# Patient Record
Sex: FEMALE | Race: WHITE | Employment: OTHER | URBAN - METROPOLITAN AREA
[De-identification: names, ages, dates, MRNs, and addresses within clinical notes are randomized per-mention and may not be internally consistent; named-entity substitution may affect disease eponyms.]

---

## 2018-03-10 ENCOUNTER — HOSPITAL ENCOUNTER (OUTPATIENT)
Age: 78
Discharge: HOME | End: 2018-03-10
Attending: EMERGENCY MEDICINE
Payer: MEDICARE

## 2018-03-10 VITALS
HEIGHT: 60 IN | HEART RATE: 74 BPM | RESPIRATION RATE: 16 BRPM | DIASTOLIC BLOOD PRESSURE: 78 MMHG | OXYGEN SATURATION: 97 % | TEMPERATURE: 98 F | SYSTOLIC BLOOD PRESSURE: 166 MMHG | WEIGHT: 124 LBS | BODY MASS INDEX: 24.35 KG/M2
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
## 2018-03-10 DIAGNOSIS — S51.801A AVULSION OF SKIN OF RIGHT FOREARM, INITIAL ENCOUNTER: Primary | ICD-10-CM

## 2018-03-10 PROCEDURE — 90471 IMMUNIZATION ADMIN: CPT

## 2018-03-10 PROCEDURE — 99203 OFFICE O/P NEW LOW 30 MIN: CPT

## 2018-03-10 RX ORDER — LEVOTHYROXINE SODIUM 175 UG/1
200 TABLET ORAL
COMMUNITY

## 2018-03-10 RX ORDER — LISINOPRIL 20 MG/1
20 TABLET ORAL DAILY
COMMUNITY

## 2018-03-10 NOTE — ED INITIAL ASSESSMENT (HPI)
Patient presents with cc of fall this am sustaining skin tear to right forearm after hitting a door knob. No head injury or LOC.+DM

## 2018-03-10 NOTE — ED PROVIDER NOTES
Patient presents with:  Fall  Laceration Abrasion (integumentary)      HPI:     Amina Minaya is a 76year old female who presents\  Denisse Caesar this AM, tipped to the side and scraped arm on door handle. Did not fall. Has been unsteady a lot per daughter.      Kathe Finn nourished, well hydrated, no distress  SKIN: good skin turgor, no obvious rashes  HEENT: atraumatic, normocephalic, ears, nose and throat are clear  EYES: sclera non icteric bilateral  NECK: supple, no adenopathy, no thyromegaly  LUNGS: clear to auscultati

## 2020-08-31 ENCOUNTER — HOSPITAL ENCOUNTER (OUTPATIENT)
Facility: HOSPITAL | Age: 80
Setting detail: OBSERVATION
Discharge: HOME HEALTH | End: 2020-09-02
Attending: EMERGENCY MEDICINE | Admitting: HOSPITALIST
Payer: MEDICARE

## 2020-08-31 DIAGNOSIS — R19.7 INTERMITTENT DIARRHEA: ICD-10-CM

## 2020-08-31 DIAGNOSIS — R53.1 WEAKNESS GENERALIZED: ICD-10-CM

## 2020-08-31 DIAGNOSIS — E86.0 DEHYDRATION: ICD-10-CM

## 2020-08-31 DIAGNOSIS — N17.9 ACUTE KIDNEY INJURY (HCC): Primary | ICD-10-CM

## 2020-09-01 ENCOUNTER — APPOINTMENT (OUTPATIENT)
Dept: GENERAL RADIOLOGY | Facility: HOSPITAL | Age: 80
End: 2020-09-01
Attending: EMERGENCY MEDICINE
Payer: MEDICARE

## 2020-09-01 ENCOUNTER — APPOINTMENT (OUTPATIENT)
Dept: CT IMAGING | Facility: HOSPITAL | Age: 80
End: 2020-09-01
Attending: HOSPITALIST
Payer: MEDICARE

## 2020-09-01 PROBLEM — E86.0 DEHYDRATION: Status: ACTIVE | Noted: 2020-09-01

## 2020-09-01 PROBLEM — R19.7 INTERMITTENT DIARRHEA: Status: ACTIVE | Noted: 2020-09-01

## 2020-09-01 PROBLEM — N17.9 ACUTE KIDNEY INJURY (HCC): Status: ACTIVE | Noted: 2020-09-01

## 2020-09-01 PROBLEM — R53.1 WEAKNESS GENERALIZED: Status: ACTIVE | Noted: 2020-09-01

## 2020-09-01 LAB
ALBUMIN SERPL-MCNC: 3.5 G/DL (ref 3.4–5)
ALBUMIN/GLOB SERPL: 0.8 {RATIO} (ref 1–2)
ALP LIVER SERPL-CCNC: 65 U/L (ref 55–142)
ALT SERPL-CCNC: 14 U/L (ref 13–56)
ANION GAP SERPL CALC-SCNC: 12 MMOL/L (ref 0–18)
ANION GAP SERPL CALC-SCNC: 7 MMOL/L (ref 0–18)
AST SERPL-CCNC: 19 U/L (ref 15–37)
ATRIAL RATE: 97 BPM
BASOPHILS # BLD AUTO: 0.04 X10(3) UL (ref 0–0.2)
BASOPHILS # BLD AUTO: 0.04 X10(3) UL (ref 0–0.2)
BASOPHILS NFR BLD AUTO: 0.4 %
BASOPHILS NFR BLD AUTO: 0.5 %
BILIRUB SERPL-MCNC: 0.6 MG/DL (ref 0.1–2)
BILIRUB UR QL STRIP.AUTO: NEGATIVE
BUN BLD-MCNC: 24 MG/DL (ref 7–18)
BUN BLD-MCNC: 28 MG/DL (ref 7–18)
BUN/CREAT SERPL: 17.9 (ref 10–20)
BUN/CREAT SERPL: 18.9 (ref 10–20)
CALCIUM BLD-MCNC: 9.1 MG/DL (ref 8.5–10.1)
CALCIUM BLD-MCNC: 9.7 MG/DL (ref 8.5–10.1)
CHLORIDE SERPL-SCNC: 101 MMOL/L (ref 98–112)
CHLORIDE SERPL-SCNC: 106 MMOL/L (ref 98–112)
CO2 SERPL-SCNC: 18 MMOL/L (ref 21–32)
CO2 SERPL-SCNC: 21 MMOL/L (ref 21–32)
COLOR UR AUTO: YELLOW
CREAT BLD-MCNC: 1.27 MG/DL (ref 0.55–1.02)
CREAT BLD-MCNC: 1.56 MG/DL (ref 0.55–1.02)
DEPRECATED HBV CORE AB SER IA-ACNC: 161.1 NG/ML (ref 18–340)
DEPRECATED RDW RBC AUTO: 42.2 FL (ref 35.1–46.3)
DEPRECATED RDW RBC AUTO: 42.7 FL (ref 35.1–46.3)
EOSINOPHIL # BLD AUTO: 0.04 X10(3) UL (ref 0–0.7)
EOSINOPHIL # BLD AUTO: 0.06 X10(3) UL (ref 0–0.7)
EOSINOPHIL NFR BLD AUTO: 0.4 %
EOSINOPHIL NFR BLD AUTO: 0.8 %
ERYTHROCYTE [DISTWIDTH] IN BLOOD BY AUTOMATED COUNT: 11.3 % (ref 11–15)
ERYTHROCYTE [DISTWIDTH] IN BLOOD BY AUTOMATED COUNT: 11.5 % (ref 11–15)
EST. AVERAGE GLUCOSE BLD GHB EST-MCNC: 203 MG/DL (ref 68–126)
GLOBULIN PLAS-MCNC: 4.4 G/DL (ref 2.8–4.4)
GLUCOSE BLD-MCNC: 114 MG/DL (ref 70–99)
GLUCOSE BLD-MCNC: 124 MG/DL (ref 70–99)
GLUCOSE BLD-MCNC: 157 MG/DL (ref 70–99)
GLUCOSE BLD-MCNC: 169 MG/DL (ref 70–99)
GLUCOSE BLD-MCNC: 172 MG/DL (ref 70–99)
GLUCOSE BLD-MCNC: 62 MG/DL (ref 70–99)
GLUCOSE UR STRIP.AUTO-MCNC: NEGATIVE MG/DL
HBA1C MFR BLD HPLC: 8.7 % (ref ?–5.7)
HCT VFR BLD AUTO: 31.5 % (ref 35–48)
HCT VFR BLD AUTO: 34.9 % (ref 35–48)
HGB BLD-MCNC: 10.5 G/DL (ref 12–16)
HGB BLD-MCNC: 11.8 G/DL (ref 12–16)
HYALINE CASTS #/AREA URNS AUTO: PRESENT /LPF
IMM GRANULOCYTES # BLD AUTO: 0.02 X10(3) UL (ref 0–1)
IMM GRANULOCYTES # BLD AUTO: 0.04 X10(3) UL (ref 0–1)
IMM GRANULOCYTES NFR BLD: 0.3 %
IMM GRANULOCYTES NFR BLD: 0.4 %
IRON SATURATION: 13 % (ref 15–50)
IRON SERPL-MCNC: 39 UG/DL (ref 50–170)
LYMPHOCYTES # BLD AUTO: 1.41 X10(3) UL (ref 1–4)
LYMPHOCYTES # BLD AUTO: 1.57 X10(3) UL (ref 1–4)
LYMPHOCYTES NFR BLD AUTO: 14.3 %
LYMPHOCYTES NFR BLD AUTO: 20.2 %
M PROTEIN MFR SERPL ELPH: 7.9 G/DL (ref 6.4–8.2)
MCH RBC QN AUTO: 34.1 PG (ref 26–34)
MCH RBC QN AUTO: 34.4 PG (ref 26–34)
MCHC RBC AUTO-ENTMCNC: 33.3 G/DL (ref 31–37)
MCHC RBC AUTO-ENTMCNC: 33.8 G/DL (ref 31–37)
MCV RBC AUTO: 101.7 FL (ref 80–100)
MCV RBC AUTO: 102.3 FL (ref 80–100)
MONOCYTES # BLD AUTO: 0.69 X10(3) UL (ref 0.1–1)
MONOCYTES # BLD AUTO: 0.87 X10(3) UL (ref 0.1–1)
MONOCYTES NFR BLD AUTO: 8.8 %
MONOCYTES NFR BLD AUTO: 8.9 %
NEUTROPHILS # BLD AUTO: 5.4 X10 (3) UL (ref 1.5–7.7)
NEUTROPHILS # BLD AUTO: 5.4 X10(3) UL (ref 1.5–7.7)
NEUTROPHILS # BLD AUTO: 7.44 X10 (3) UL (ref 1.5–7.7)
NEUTROPHILS # BLD AUTO: 7.44 X10(3) UL (ref 1.5–7.7)
NEUTROPHILS NFR BLD AUTO: 69.3 %
NEUTROPHILS NFR BLD AUTO: 75.7 %
NITRITE UR QL STRIP.AUTO: NEGATIVE
OSMOLALITY SERPL CALC.SUM OF ELEC: 282 MOSM/KG (ref 275–295)
OSMOLALITY SERPL CALC.SUM OF ELEC: 283 MOSM/KG (ref 275–295)
P AXIS: 68 DEGREES
P-R INTERVAL: 180 MS
PH UR STRIP.AUTO: 5 [PH] (ref 4.5–8)
PLATELET # BLD AUTO: 206 10(3)UL (ref 150–450)
PLATELET # BLD AUTO: 216 10(3)UL (ref 150–450)
POTASSIUM SERPL-SCNC: 3.9 MMOL/L (ref 3.5–5.1)
POTASSIUM SERPL-SCNC: 4.4 MMOL/L (ref 3.5–5.1)
PROT UR STRIP.AUTO-MCNC: NEGATIVE MG/DL
Q-T INTERVAL: 350 MS
QRS DURATION: 72 MS
QTC CALCULATION (BEZET): 444 MS
R AXIS: 18 DEGREES
RBC # BLD AUTO: 3.08 X10(6)UL (ref 3.8–5.3)
RBC # BLD AUTO: 3.43 X10(6)UL (ref 3.8–5.3)
SARS-COV-2 RNA RESP QL NAA+PROBE: NOT DETECTED
SODIUM SERPL-SCNC: 131 MMOL/L (ref 136–145)
SODIUM SERPL-SCNC: 134 MMOL/L (ref 136–145)
SP GR UR STRIP.AUTO: 1.02 (ref 1–1.03)
T AXIS: 59 DEGREES
TOTAL IRON BINDING CAPACITY: 289 UG/DL (ref 240–450)
TRANSFERRIN SERPL-MCNC: 194 MG/DL (ref 200–360)
TROPONIN I SERPL-MCNC: <0.045 NG/ML (ref ?–0.04)
TSI SER-ACNC: 1.74 MIU/ML (ref 0.36–3.74)
UROBILINOGEN UR STRIP.AUTO-MCNC: <2 MG/DL
VENTRICULAR RATE: 97 BPM
WBC # BLD AUTO: 7.8 X10(3) UL (ref 4–11)
WBC # BLD AUTO: 9.8 X10(3) UL (ref 4–11)

## 2020-09-01 PROCEDURE — 71045 X-RAY EXAM CHEST 1 VIEW: CPT | Performed by: EMERGENCY MEDICINE

## 2020-09-01 PROCEDURE — 99220 INITIAL OBSERVATION CARE,LEVL III: CPT | Performed by: HOSPITALIST

## 2020-09-01 PROCEDURE — 70450 CT HEAD/BRAIN W/O DYE: CPT | Performed by: HOSPITALIST

## 2020-09-01 RX ORDER — ACETAMINOPHEN 325 MG/1
650 TABLET ORAL EVERY 6 HOURS PRN
Status: DISCONTINUED | OUTPATIENT
Start: 2020-09-01 | End: 2020-09-02

## 2020-09-01 RX ORDER — DEXTROSE MONOHYDRATE 25 G/50ML
50 INJECTION, SOLUTION INTRAVENOUS
Status: DISCONTINUED | OUTPATIENT
Start: 2020-09-01 | End: 2020-09-02

## 2020-09-01 RX ORDER — HYDROCHLOROTHIAZIDE 25 MG/1
25 TABLET ORAL DAILY
Status: DISCONTINUED | OUTPATIENT
Start: 2020-09-01 | End: 2020-09-02

## 2020-09-01 RX ORDER — ONDANSETRON 2 MG/ML
4 INJECTION INTRAMUSCULAR; INTRAVENOUS EVERY 6 HOURS PRN
Status: DISCONTINUED | OUTPATIENT
Start: 2020-09-01 | End: 2020-09-02

## 2020-09-01 RX ORDER — ELVITEGRAVIR, COBICISTAT, EMTRICITABINE, AND TENOFOVIR ALAFENAMIDE 150; 150; 200; 10 MG/1; MG/1; MG/1; MG/1
TABLET ORAL
Status: ON HOLD | COMMUNITY
End: 2020-11-25

## 2020-09-01 RX ORDER — LEVOTHYROXINE SODIUM 0.2 MG/1
200 TABLET ORAL
Status: DISCONTINUED | OUTPATIENT
Start: 2020-09-01 | End: 2020-09-01 | Stop reason: SDUPTHER

## 2020-09-01 RX ORDER — LISINOPRIL 20 MG/1
20 TABLET ORAL DAILY
Status: DISCONTINUED | OUTPATIENT
Start: 2020-09-01 | End: 2020-09-02

## 2020-09-01 RX ORDER — METOCLOPRAMIDE HYDROCHLORIDE 5 MG/ML
10 INJECTION INTRAMUSCULAR; INTRAVENOUS EVERY 8 HOURS PRN
Status: DISCONTINUED | OUTPATIENT
Start: 2020-09-01 | End: 2020-09-02

## 2020-09-01 RX ORDER — SODIUM CHLORIDE 9 MG/ML
INJECTION, SOLUTION INTRAVENOUS CONTINUOUS
Status: DISCONTINUED | OUTPATIENT
Start: 2020-09-01 | End: 2020-09-02

## 2020-09-01 NOTE — ED PROVIDER NOTES
Patient Seen in: [facility] Emergency Department      History   Patient presents with:  Fatigue    Stated Complaint: weakness, blood sugars flucuating    HPI    68-year-old female past medical history of HIV diabetes asthma hypertension presents ED 25   Ht 152.4 cm (5')   Wt 45.4 kg   SpO2 98%   BMI 19.53 kg/m²         Physical Exam  Vitals signs and nursing note reviewed. Constitutional:       Appearance: She is well-developed. HENT:      Head: Normocephalic and atraumatic.    Eyes:      Pupils: CBC WITH DIFFERENTIAL WITH PLATELET    Narrative: The following orders were created for panel order CBC WITH DIFFERENTIAL WITH PLATELET.   Procedure                               Abnormality         Status                     --------- [facility])  (primary encounter diagnosis)  Dehydration  Weakness generalized  Intermittent diarrhea    Disposition:  Admit  9/1/2020  1:20 am    Follow-up:  No follow-up provider specified.         Medications Prescribed:  Current Discharge Medication List

## 2020-09-01 NOTE — ED INITIAL ASSESSMENT (HPI)
77 yo F, hx of HIV, DM II, presents with weakness x 4 days. Family reports she was walking 5 days ago and progressively getting weak. She ate some soup 4 days ago and had loose bowel movement and has been very weak since then.

## 2020-09-01 NOTE — PROGRESS NOTES
Pt seen and examined earlier this am. Overall better but still seems very weak.      Cont IVF  PT OT       Please see H&P from this am.     Concha Vaca MD

## 2020-09-01 NOTE — PROGRESS NOTES
NURSING ADMISSION NOTE      Patient admitted via Cart  Oriented to room. Safety precautions initiated. Bed in low position. Call light in reach.     Completed admission assessment with patient  Neuro checks Q 4  Enhanced precautions while PCR pending

## 2020-09-01 NOTE — PHYSICAL THERAPY NOTE
PHYSICAL THERAPY QUICK EVALUATION - INPATIENT    Room Number: 0340/4293-A  Evaluation Date: 9/1/2020  Presenting Problem: Dehydration, generalized weakness, MIREILLE and intermittent diarrhea  Physician Order: PT Eval and Treat     SARS-COV2 PCR pending    Ch 4/5    NEUROLOGICAL FINDINGS  Neurological Findings: Sensation           Sensation: BLE symmetrical/intact to light touch       ACTIVITY TOLERANCE                         O2 WALK                  AM-PAC '6-Clicks' INPATIENT SHORT FORM - BASIC MOBILITY  How veronica    Patient End of Session: In bed;Needs met;Call light within reach;RN aware of session/findings; All patient questions and concerns addressed; Alarm set    ASSESSMENT   Patient is a 78year old female admitted on 8/31/2020 for Dehydration, generaliz

## 2020-09-01 NOTE — OCCUPATIONAL THERAPY NOTE
OCCUPATIONAL THERAPY QUICK EVALUATION - INPATIENT    Room Number: 8727/9877-H  Evaluation Date: 9/1/2020     Type of Evaluation: Quick Eval  Presenting Problem: weakness, MIREILLE    Physician Order: IP Consult to Occupational Therapy  Reason for Therapy:  ADL/ following;  Right Shoulder flexion  4-/5  Left Shoulder flexion  4-/5    NEUROLOGICAL FINDINGS                   ACTIVITY TOLERANCE                         O2 SATURATIONS                ACTIVITIES OF DAILY LIVING ASSESSMENT  AM-PAC ‘6-Clicks’ Inpatient Bonny Vasquez impacting engagement in ADL/IADL  LOW  1 - 3 performance deficits    Client Assessment/Performance Deficits  LOW - No comorbidities nor modifications of tasks    Clinical Decision Making  LOW - Analysis of occupational profile, problem-focused assessments,

## 2020-09-01 NOTE — H&P
[place] [facility]  History and Physical     Roro Has Patient Status:  Emergency    1940 MRN [id number]   Location [address] [place] [address] Attending Maura Guerrero, [address] [place] [address] Day # 0 PCP Keira Benjamin MD     Chief Complaint: Devorah Vivar Exam:    /56   Pulse 91   Temp 99.9 °F (37.7 °C) (Temporal)   Resp 22   Ht 5' (1.524 m)   Wt 100 lb (45.4 kg)   SpO2 99%   BMI 19.53 kg/m²   General: No acute distress. Alert and oriented x 3. HEENT: Normocephalic atraumatic. Dry mucous membranes. MD Royce  9/1/2020

## 2020-09-01 NOTE — DIETARY NOTE
[facility]    NUTRITION ASSESSMENT    Pt does not meet malnutrition criteria.     NUTRITION DIAGNOSIS/PROBLEM:    Inadequate oral intake related to physiological causes as evidenced by estimated intake less than estimated needs    NUTRITION INTERVENTIO mass    MEDICATIONS:  Insulin, IVF    LABS:  Glu 124; Na 134; BUN 24; Cr 1.27    Pt is at moderate nutrition risk    FOLLOW-UP DATE:  9/4    Rosa Gonzales RD,AVEN  Pager #[phone] [place] [phone]

## 2020-09-02 VITALS
TEMPERATURE: 98 F | HEIGHT: 60 IN | OXYGEN SATURATION: 98 % | SYSTOLIC BLOOD PRESSURE: 142 MMHG | WEIGHT: 103.13 LBS | BODY MASS INDEX: 20.25 KG/M2 | RESPIRATION RATE: 18 BRPM | HEART RATE: 93 BPM | DIASTOLIC BLOOD PRESSURE: 48 MMHG

## 2020-09-02 LAB
GLUCOSE BLD-MCNC: 121 MG/DL (ref 70–99)
GLUCOSE BLD-MCNC: 189 MG/DL (ref 70–99)
GLUCOSE BLD-MCNC: 208 MG/DL (ref 70–99)
GLUCOSE BLD-MCNC: 73 MG/DL (ref 70–99)

## 2020-09-02 PROCEDURE — 99217 OBSERVATION CARE DISCHARGE: CPT | Performed by: HOSPITALIST

## 2020-09-02 NOTE — CM/SW NOTE
MAURIZIO paged [address] care with referral.  Liaison will meet with the patient/familyto provide choice, explanation of services, and financial disclosure.     Residential home healthcare  referral pending

## 2020-09-02 NOTE — HOME CARE LIAISON
MET WITH PTNT AND OFFERED CHOICE  OF AGENCIES. PTNT AGREEABLE TO [facility]. MET WITH PTNT TO DISCUSS HOME HEALTH SERVICES AND COVERAGE CRITERIA. PTNT AGREEABLE TO [facility]. PTNT GIVEN RESIDENTIAL BROCHURE.  RESIDENTIAL WITH PROVIDE SN/PT ON DISC

## 2020-09-02 NOTE — PROGRESS NOTES
[facility]  Progress Note     Roseanne Suarez Patient Status:  Observation    1940 MRN [id number]   [facility] 3NE-A Attending Weston Serna MD   Hosp Day # 0 PCP Maria Antonia West MD     Chief Complaint: weakness    S: Patient feels 1 puff Inhalation Daily   • Insulin Aspart Pen  1-10 Units Subcutaneous TID AC and HS   • Levothyroxine Sodium  200 mcg Oral Before breakfast   • Drnvmdr-Vvzqc-Qxafgyqo-TenofAF  1 tablet Oral Daily   • insulin detemir  10 Units Subcutaneous Daily       ASS

## 2020-09-02 NOTE — PLAN OF CARE
Pt is A&O x3, forgetful at times. VSS- NSR on tele. SpO2 remains stable on RA. Lung sounds diminished but clear. Neuro checks q4 hr continued. Pt bedtime accucheck= 62. Pt refused orange juice.  PRN dextrose IV administered and hypoglycemic protocol followe patient/family in tolerated activity level and precautions  - Recommend use of  RW for transfers and ambulation   Outcome: Progressing     Problem: Impaired Activities of Daily Living  Goal: Achieve highest/safest level of independence in self care  Descri

## 2020-09-02 NOTE — PROGRESS NOTES
Patient alertx4, ra, tele, incontinent at times, up with one assist and the walker, IV fluids, poor appetite, QID accuchecks. Patient's home medication Charisse Ricardo is in the patient's bin. Will continue to monitor.

## 2020-09-02 NOTE — PROGRESS NOTES
Patient discharged to home with daughter. Discharge paperwork reviewed with daughter. IV  And tele removed from patient. Paperwork discussed with daughter Stephenie Young.   Home medical equipment called and inquired about what equipment patient needed at home and

## 2020-09-03 ENCOUNTER — PATIENT OUTREACH (OUTPATIENT)
Dept: CASE MANAGEMENT | Age: 80
End: 2020-09-03

## 2020-09-03 DIAGNOSIS — Z02.9 ENCOUNTERS FOR UNSPECIFIED ADMINISTRATIVE PURPOSE: ICD-10-CM

## 2020-09-03 PROCEDURE — 1111F DSCHRG MED/CURRENT MED MERGE: CPT | Performed by: CLINICAL NURSE SPECIALIST

## 2020-09-03 NOTE — PROGRESS NOTES
DAYANARAY for post hospital follow up. [facility] contact information provided as well as [facility] office number, [phone].

## 2020-09-03 NOTE — DISCHARGE SUMMARY
[facility] H[facility]PITAL[facility]  DISCHARGE SUMMARY     Elizabeth Monteiro Patient Status:  Observation    1940 MRN [id number]   [facility] 3NE-A Attending No att. providers found   Hosp Day # 0 PCP Katy Higuera MD     Date of Admission: 2020  Date of Genvoya [phone] MG Tabs  Generic drug:  Fskheye-Dazxy-Ilwxizwu-TenofAF      Take by mouth. Refills:  0     LANTUS SC      Inject 30 Units into the skin daily.    Refills:  0     Levothyroxine Sodium 175 MCG Tabs      Take 175 mcg by mouth before

## 2020-09-04 NOTE — PROGRESS NOTES
MILTON did hear back from [facility]. The company will be contacting the family today for scheduling a start of service date. [facility] did confirm [facility] had the daughter's name and number for scheduling. NC left a message updating the pt's daughter.

## 2020-09-04 NOTE — PROGRESS NOTES
Initial Post Discharge Follow Up   Discharge Date: 9/2/20  Contact Date: 9/4/2020    Consent Verification:  Assessment Completed With: Caregiver: Ellen Permission received per patient?  written  HIPAA Verified?   Yes    Discharge Dx:     Dehydration  Acu for the patient since discharge. The daughter did admit glucose levels have remained elevated with readings at 229 and 151 yesterday. The daughter again denies any further symptoms than what was previously discussed.  The daughter has been in contact with t Medication Sig Dispense Refill   • Rozkcxj-Bvbww-Xnfcfebs-TenofAF (GENVOYA) [phone] MG Oral Tab Take by mouth. • lisinopril 20 MG Oral Tab Take 20 mg by mouth daily.      • Levothyroxine Sodium 175 MCG Oral Tab Take 175 mcg by mouth before stanley PCP TCM/HFU appointment: scheduled at D/C within 7-14 days  no; The daughter declined an appointment with the [facility]. The daughter also confirmed the patient is not established with Dr. Juanito Valente office.   The daughter has scheduled an appointment with the P

## 2020-10-16 ENCOUNTER — HOSPITAL ENCOUNTER (INPATIENT)
Facility: HOSPITAL | Age: 80
LOS: 4 days | Discharge: HOME HEALTH | DRG: 872 | End: 2020-10-20
Attending: EMERGENCY MEDICINE | Admitting: HOSPITALIST
Payer: MEDICARE

## 2020-10-16 ENCOUNTER — APPOINTMENT (OUTPATIENT)
Dept: GENERAL RADIOLOGY | Facility: HOSPITAL | Age: 80
DRG: 872 | End: 2020-10-16
Attending: EMERGENCY MEDICINE
Payer: MEDICARE

## 2020-10-16 DIAGNOSIS — N30.00 ACUTE CYSTITIS WITHOUT HEMATURIA: Primary | ICD-10-CM

## 2020-10-16 PROCEDURE — 71045 X-RAY EXAM CHEST 1 VIEW: CPT | Performed by: EMERGENCY MEDICINE

## 2020-10-16 PROCEDURE — 99223 1ST HOSP IP/OBS HIGH 75: CPT | Performed by: HOSPITALIST

## 2020-10-16 RX ORDER — TERBINAFINE HYDROCHLORIDE 250 MG/1
250 TABLET ORAL DAILY
COMMUNITY

## 2020-10-16 RX ORDER — MIRTAZAPINE 15 MG/1
15 TABLET, FILM COATED ORAL NIGHTLY
COMMUNITY

## 2020-10-16 RX ORDER — DEXTROSE MONOHYDRATE 25 G/50ML
50 INJECTION, SOLUTION INTRAVENOUS
Status: DISCONTINUED | OUTPATIENT
Start: 2020-10-16 | End: 2020-10-20

## 2020-10-16 RX ORDER — ONDANSETRON 2 MG/ML
4 INJECTION INTRAMUSCULAR; INTRAVENOUS ONCE
Status: COMPLETED | OUTPATIENT
Start: 2020-10-16 | End: 2020-10-16

## 2020-10-16 RX ORDER — HEPARIN SODIUM 5000 [USP'U]/ML
5000 INJECTION, SOLUTION INTRAVENOUS; SUBCUTANEOUS EVERY 8 HOURS SCHEDULED
Status: DISCONTINUED | OUTPATIENT
Start: 2020-10-16 | End: 2020-10-20

## 2020-10-16 RX ORDER — ONDANSETRON 2 MG/ML
4 INJECTION INTRAMUSCULAR; INTRAVENOUS EVERY 6 HOURS PRN
Status: DISCONTINUED | OUTPATIENT
Start: 2020-10-16 | End: 2020-10-20

## 2020-10-16 RX ORDER — MECLIZINE HCL 12.5 MG/1
12.5 TABLET ORAL 3 TIMES DAILY PRN
COMMUNITY

## 2020-10-16 RX ORDER — SODIUM CHLORIDE 9 MG/ML
INJECTION, SOLUTION INTRAVENOUS CONTINUOUS
Status: DISCONTINUED | OUTPATIENT
Start: 2020-10-16 | End: 2020-10-18

## 2020-10-16 RX ORDER — SODIUM CHLORIDE 9 MG/ML
INJECTION, SOLUTION INTRAVENOUS CONTINUOUS
Status: ACTIVE | OUTPATIENT
Start: 2020-10-16 | End: 2020-10-16

## 2020-10-16 RX ORDER — METOCLOPRAMIDE HYDROCHLORIDE 5 MG/ML
5 INJECTION INTRAMUSCULAR; INTRAVENOUS EVERY 8 HOURS PRN
Status: DISCONTINUED | OUTPATIENT
Start: 2020-10-16 | End: 2020-10-20

## 2020-10-16 NOTE — H&P
[facility] H[facility]ITAL[facility]  History and Physical     Stoney Skains Patient Status:  Emergency    1940 MRN [id number]   Location [address] [facility] Attending Quoc Cueva MD   Hosp Day # 0 PCP Miky Arita     Chief Complaint: Lamont Cornejo Glargine (LANTUS SC), Inject 30 Units into the skin daily. , Disp: , Rfl:     •  Budesonide-Formoterol Fumarate (SYMBICORT IN), Inhale into the lungs 2 (two) times daily. , Disp: , Rfl:         Review of Systems:   A comprehensive 14 point review of system hemodynamics  8. Diabetes mellitus  1. Decrease Lantus to 15/day  2. Correctional scale  9. Hypothyroidism  1. Synthroid   10. HIV   1. Continue PTA therapy  11. Asthma  1. BD  12.  Failure to thrive    Quality:  · DVT Prophylaxis: Heparin   · Banks: No  ·

## 2020-10-16 NOTE — ED NOTES
Pt daughter noted diarrhea and pt was soiled when entering. Pt was cleaned and stool was soft not watery.

## 2020-10-16 NOTE — ED PROVIDER NOTES
Patient Seen in: [facility] Emergency Department      History   Patient presents with:  Fatigue    Stated Complaint: fatigue    HPI    78-year-old female who is brought to the emergency room today for complaint of not feeling well.   Apparently she h atraumatic conjunctiva is clear. Sclerae anicteric. Neck is supple. Lungs are clear to auscultation bilaterally.   Heart is regular rate and rhythm with a loud murmur but no gallop or rub    Abdomen is soft nondistended nontender to deep palpation ther following orders were created for panel order CBC WITH DIFFERENTIAL WITH PLATELET.   Procedure                               Abnormality         Status                     ---------                               -----------         ------ possible urosepsis. I spoke with the [facility] who came down saw and evaluated the patient.   He agrees admit the patient primarily to the hospital.  Admission disposition: 10/16/2020  6:40 PM                   Disposition and Plan     Clinical Rakel Rivera

## 2020-10-17 PROBLEM — N17.9 AKI (ACUTE KIDNEY INJURY) (HCC): Status: ACTIVE | Noted: 2020-09-01

## 2020-10-17 PROCEDURE — 99232 SBSQ HOSP IP/OBS MODERATE 35: CPT | Performed by: HOSPITALIST

## 2020-10-17 RX ORDER — ACETAMINOPHEN 325 MG/1
650 TABLET ORAL EVERY 6 HOURS PRN
Status: DISCONTINUED | OUTPATIENT
Start: 2020-10-17 | End: 2020-10-20

## 2020-10-17 RX ORDER — POTASSIUM CHLORIDE 20 MEQ/1
40 TABLET, EXTENDED RELEASE ORAL EVERY 4 HOURS
Status: COMPLETED | OUTPATIENT
Start: 2020-10-17 | End: 2020-10-17

## 2020-10-17 RX ORDER — MELOXICAM 15 MG/1
15 TABLET ORAL DAILY
COMMUNITY

## 2020-10-17 RX ORDER — MAGNESIUM OXIDE 400 MG (241.3 MG MAGNESIUM) TABLET
800 TABLET ONCE
Status: COMPLETED | OUTPATIENT
Start: 2020-10-17 | End: 2020-10-17

## 2020-10-17 RX ORDER — TRAMADOL HYDROCHLORIDE 50 MG/1
50 TABLET ORAL EVERY 12 HOURS PRN
Status: DISCONTINUED | OUTPATIENT
Start: 2020-10-17 | End: 2020-10-20

## 2020-10-17 NOTE — PLAN OF CARE
Assumed care @ 0730. Patient denies pain with with urination, c/o back pain. Patien with poor oral intake, no diarrhea noted. BP 99'X systolic, patient denies dizziness, Dr. Stacy Ruff notified,  cc bolus given. Tylenol given as needed.

## 2020-10-17 NOTE — DIETARY NOTE
[facility]    NUTRITION INITIAL ASSESSMENT    Pt meets moderate malnutrition criteria.     CRITERIA FOR MALNUTRITION DIAGNOSIS:  Criteria for non-severe malnutrition diagnosis: chronic illness related to wt loss 5% in 1 month    NUTRITION DIAGNOSIS/PRO BID    FOOD/NUTRITION RELATED HISTORY:  Appetite: Poor  Intake: <50%  Intake Meeting Needs: No, but supplements to maximize  Food Allergies: No  Cultural/Ethnic/Moravian Preferences Addresses: Yes    NUTRITION RELATED PHYSICAL FINDINGS:     1. Body Fat/Mu

## 2020-10-17 NOTE — PROGRESS NOTES
NURSING ADMISSION NOTE      Patient admitted via Cart  Oriented to room. Safety precautions initiated. Bed in low position. Call light in reach. Assumed care of pt around 2130. Pt here for UTI, fatigue, N/V/D. Daughter at bedside.  Pt speaking lit

## 2020-10-17 NOTE — PROGRESS NOTES
[facility]ITAL[facility]  Progress Note     Gulshan Vargas Patient Status:  Inpatient    1940 MRN [id number]   [facility] 4NW-A Attending Zahida Chin MD   Hosp Day # 1 PCP Padmini Arita     Chief Complaint: n/v,d    S: Patient no further Sodium (Porcine)  5,000 Units Subcutaneous Q8H [address]   • Insulin Aspart Pen  1-10 Units Subcutaneous TID AC and HS   • piperacillin-tazobactam  3.375 g Intravenous Q12H       ASSESSMENT / PLAN:     1. Sepsis d/t UTI  2. Leukocytosis  1. IVF  2. IV abx  3.  Fo none

## 2020-10-17 NOTE — CONSULTS
[facility] Pharmacy Note:  Renal Adjustment for piperacillin/tazobactam (Delynn Innocent)    Gisella Dewey is a 78year old patient who has been prescribed piperacillin/tazobactam (ZOSYN) 3.375 g every 8 hrs.   CrCl is estimated creatinine clearance is 16.5 mL/min (A) (base

## 2020-10-18 PROCEDURE — 99232 SBSQ HOSP IP/OBS MODERATE 35: CPT | Performed by: HOSPITALIST

## 2020-10-18 RX ORDER — MAGNESIUM OXIDE 400 MG (241.3 MG MAGNESIUM) TABLET
800 TABLET ONCE
Status: COMPLETED | OUTPATIENT
Start: 2020-10-18 | End: 2020-10-18

## 2020-10-18 NOTE — PHYSICAL THERAPY NOTE
PHYSICAL THERAPY EVALUATION - INPATIENT     Room Number: 433/433-A  Evaluation Date: 10/18/2020  Type of Evaluation: Initial  Physician Order: PT Eval and Treat    Presenting Problem: Sepsis due to UTI  Reason for Therapy: Mobility Dysfunction and Disc SUBJECTIVE  \"My problem is I never leave the house to walk. \"     Patient self-stated goal is go home tomorrow.      OBJECTIVE  Precautions:  needed(Nepali speaking)  Fall Risk: High fall risk    WEIGHT BEARING RESTRICTION  Weight Bearing Foot flat;L Foot flat(kyphotic)  Stoop/Curb Assistance: Not tested  Comment : n/a      Skilled Therapy Provided:     During eval and treat, pt on room air.      Bed Mobility:  Rolling right = NT   Rolling left = indep    Supine to sit = mod indep   Sit to s comorbidities manifest themselves as functional limitations in independent bed mobility, transfers and gait.    The patient is below baseline and would benefit from skilled inpatient PT to address the above deficits to assist patient in returning to prior l

## 2020-10-18 NOTE — PLAN OF CARE
Problem: Cardiovascular  Goal: Maintains optimal cardiac output and hemodynamic stability  Description: INTERVENTIONS:  - Monitor vital signs and trends  - Administer ordered vasoactive medications  - Assess skin color and temperature  Outcome: Progressi

## 2020-10-18 NOTE — PROGRESS NOTES
[facility] Pharmacy Note:  Renal Adjustment for piperacillin/tazobactam (Jemal Big)    Keith Barron is a 78year old patient who has been prescribed piperacillin/tazobactam (ZOSYN) 3.375  g every 12 hrs.   CrCl is estimated creatinine clearance is 22.7 mL/min (A) (ba

## 2020-10-18 NOTE — PROGRESS NOTES
[facility] HOSPITALIST  Progress Note     Vineet Michael Patient Status:  Inpatient    1940 MRN [id number]   [facility] 4NW-A Attending Yanira Mak MD   [facility] Day # 2 PCP Chase Arita     Chief Complaint: n/v,d    S: Pt feels much nahun breakfast   • insulin detemir  15 Units Subcutaneous Daily   • Heparin Sodium (Porcine)  5,000 Units Subcutaneous Q8H [address]   • Insulin Aspart Pen  1-10 Units Subcutaneous TID AC and HS   • piperacillin-tazobactam  3.375 g Intravenous Q12H       ASSESSMENT /

## 2020-10-19 PROCEDURE — 99232 SBSQ HOSP IP/OBS MODERATE 35: CPT | Performed by: HOSPITALIST

## 2020-10-19 RX ORDER — VANCOMYCIN HYDROCHLORIDE 125 MG/1
125 CAPSULE ORAL EVERY 6 HOURS SCHEDULED
Status: DISCONTINUED | OUTPATIENT
Start: 2020-10-19 | End: 2020-10-20

## 2020-10-19 RX ORDER — SODIUM CHLORIDE 9 MG/ML
INJECTION, SOLUTION INTRAVENOUS CONTINUOUS
Status: DISCONTINUED | OUTPATIENT
Start: 2020-10-19 | End: 2020-10-20

## 2020-10-19 RX ORDER — MAGNESIUM SULFATE HEPTAHYDRATE 40 MG/ML
2 INJECTION, SOLUTION INTRAVENOUS ONCE
Status: COMPLETED | OUTPATIENT
Start: 2020-10-19 | End: 2020-10-19

## 2020-10-19 NOTE — PROGRESS NOTES
[facility] HOSPITALIST  Progress Note     Amina Minaya Patient Status:  Inpatient    1940 MRN [id number]   [facility] 4NW-A Attending Myles Alfonso MD   [address] Day # 3 PCP Dillon Arita     Chief Complaint: n/v,d    S: diarrhea recurred x Epic.    Medications:   • piperacillin-tazobactam  3.375 g Intravenous Q8H   • Ezuvbie-Aqxsi-Gkgytgzb-TenofAF  1 tablet Oral Daily   • Fluticasone Furoate-Vilanterol  1 puff Inhalation QAM   • Levothyroxine Sodium  175 mcg Oral Before breakfast   • insulin

## 2020-10-19 NOTE — PLAN OF CARE
Alert and oriented x 4. VSS. Afebrile. No c/o pain or SOB. Blood sugar 99 this evening. No insulin coverage needed. IV zosyn q 8. Up with assist to the bathroom. Poor appetite. Resting comfortably. Will continue to monitor.

## 2020-10-19 NOTE — CM/SW NOTE
Pt discussed during rounds. Pt is current with [facility]. YANIRA entered. No additional needs identified at this time. RN to contact SW/CM if needs discharge needs arise.     HOME SITUATION  Type of Home: House   Home Layout: One level  Stairs to Enter : 1  Raili

## 2020-10-19 NOTE — PLAN OF CARE
Assumed care @ 0730. Patient febrile, denies pain with urination. Patient's vital signs stable. No diarrhea noted.

## 2020-10-20 VITALS
TEMPERATURE: 98 F | HEART RATE: 75 BPM | HEIGHT: 60 IN | OXYGEN SATURATION: 97 % | WEIGHT: 103 LBS | SYSTOLIC BLOOD PRESSURE: 131 MMHG | DIASTOLIC BLOOD PRESSURE: 44 MMHG | BODY MASS INDEX: 20.22 KG/M2 | RESPIRATION RATE: 20 BRPM

## 2020-10-20 PROCEDURE — 99239 HOSP IP/OBS DSCHRG MGMT >30: CPT | Performed by: HOSPITALIST

## 2020-10-20 RX ORDER — VANCOMYCIN HYDROCHLORIDE 125 MG/1
125 CAPSULE ORAL EVERY 6 HOURS SCHEDULED
Qty: 56 CAPSULE | Refills: 0 | Status: SHIPPED | OUTPATIENT
Start: 2020-10-20 | End: 2020-11-03

## 2020-10-20 RX ORDER — VANCOMYCIN HYDROCHLORIDE 125 MG/1
125 CAPSULE ORAL 4 TIMES DAILY
Status: DISCONTINUED | OUTPATIENT
Start: 2020-10-20 | End: 2020-10-20

## 2020-10-20 RX ORDER — CEPHALEXIN 500 MG/1
500 CAPSULE ORAL 4 TIMES DAILY
Qty: 16 CAPSULE | Refills: 0 | Status: SHIPPED | OUTPATIENT
Start: 2020-10-20 | End: 2020-10-24

## 2020-10-20 NOTE — PHYSICAL THERAPY NOTE
PHYSICAL THERAPY TREATMENT NOTE - INPATIENT    Room Number: 433/433-A     Session: 1   Number of Visits to Meet Established Goals: 3    Presenting Problem: Sepsis due to UTI    Pt is 78year old female admitted on 10/16/2020 from home with c/o nausea, Sai  from another person does the patient currently need. ..   -   Moving to and from a bed to a chair (including a wheelchair)?: A Little   -   Need to walk in hospital room?: A Little   -   Climbing 3-5 steps with a railing?: A Little       AM-PAC Score:  Raw patients with this level of impairment may benefit from home c HHPT. DISCHARGE RECOMMENDATIONS  PT Discharge Recommendations: Home with home health PT     PLAN  PT Treatment Plan: Endurance; Patient education;Gait training;Strengthening;Stoop training;

## 2020-10-20 NOTE — PLAN OF CARE
Patient A&0 x 4.  VS stable, afebrile. No complaints of pain. PO vanco started, isolations put in place. Continues with looser stools. No emesis or complaints of nausea. SBA with walker, moving well. IV fluids and antibiotics as ordered.   Continue to management of diabetes  Outcome: Progressing  Goal: Electrolytes maintained within normal limits  Description: INTERVENTIONS:  - Monitor labs and rhythm and assess patient for signs and symptoms of electrolyte imbalances  - Administer electrolyte replaceme

## 2020-10-20 NOTE — PLAN OF CARE
Problem: Impaired Activities of Daily Living  Goal: Achieve highest/safest level of independence in self care  Description: Interventions:  - Assess ability and encourage patient to participate in ADLs to maximize function  - Promote sitting position [facility]

## 2020-10-21 NOTE — DISCHARGE SUMMARY
[facility] HOSPITALIST  DISCHARGE SUMMARY     Herminia Cormier Patient Status:  Inpatient    1940 MRN [id number]   [facility] 4NW-A Attending Glen Drummond MD   [facility] Day # 4 PCP Sammi Arita     Date of Admission: 10/16/2020  Date of Disch Remeron a couple days ago which made patient groggy - daughter does not want patient to continue. Patient has not been eating much, similar to last admission. Today patient did not want to eat, felt ill.  Acidic juice given and patient with nausea and vomit Refills: 0     Meloxicam 15 MG Tabs      Take 15 mg by mouth daily. 10/17- Patient has not started   Refills: 0     mirtazapine 15 MG Tabs  Commonly known as: REMERON      Take 15 mg by mouth nightly.    Refills: 0     SYMBICORT IN      Inhale into the lung

## 2020-10-21 NOTE — OCCUPATIONAL THERAPY NOTE
OCCUPATIONAL THERAPY EVALUATION - INPATIENT     Room Number: 433/433-A  Evaluation Date: 10/20/2020  Type of Evaluation: Initial  Presenting Problem: Sepsis d/t UTI    Physician Order: IP Consult to Occupational Therapy  Reason for Therapy: ADL/IADL Dysfun OBJECTIVE  Precautions:  needed(Indonesian speaking)  Fall Risk: High fall risk    WEIGHT BEARING RESTRICTION  Weight Bearing Restriction: None                PAIN ASSESSMENT  Rating: Unable to rate  Location: low back  Management Techniques: A present    ASSESSMENT     Patient is a 78year old female admitted on 10/16/2020 for UTI. Complete medical history and occupational profile noted above. Functional outcome measures completed include:  The AM-TARUN ' '6-Clicks' Inpatient Daily Activity Short F Established Goals: 5    ADL GOALS:  Patient will perform lower body dressing w/ min A and with adaptive equipment PRN  Patient will perform toileting with supervision and with adaptive equipment PRN.     Functional Transfer Goals:  Patient will transfer fro

## 2020-10-21 NOTE — PROGRESS NOTES
Pt discharge paperwork done. IV removed. Pt sitting on bed waiting for her daughter. RN and charge RN called daughter to check on status, were told she is on her way. Endorsed to RADHA MILLAN [facility] RN.

## 2020-11-23 ENCOUNTER — APPOINTMENT (OUTPATIENT)
Dept: GENERAL RADIOLOGY | Facility: HOSPITAL | Age: 80
End: 2020-11-23
Attending: EMERGENCY MEDICINE
Payer: MEDICARE

## 2020-11-23 ENCOUNTER — HOSPITAL ENCOUNTER (OUTPATIENT)
Facility: HOSPITAL | Age: 80
Setting detail: OBSERVATION
Discharge: HOME | End: 2020-11-27
Attending: EMERGENCY MEDICINE | Admitting: HOSPITALIST
Payer: MEDICARE

## 2020-11-23 DIAGNOSIS — R11.2 NAUSEA VOMITING AND DIARRHEA: Primary | ICD-10-CM

## 2020-11-23 DIAGNOSIS — E86.0 DEHYDRATION: ICD-10-CM

## 2020-11-23 DIAGNOSIS — R19.7 NAUSEA VOMITING AND DIARRHEA: Primary | ICD-10-CM

## 2020-11-23 PROCEDURE — 71045 X-RAY EXAM CHEST 1 VIEW: CPT | Performed by: EMERGENCY MEDICINE

## 2020-11-23 RX ORDER — ONDANSETRON 2 MG/ML
4 INJECTION INTRAMUSCULAR; INTRAVENOUS ONCE
Status: COMPLETED | OUTPATIENT
Start: 2020-11-23 | End: 2020-11-23

## 2020-11-24 ENCOUNTER — APPOINTMENT (OUTPATIENT)
Dept: CT IMAGING | Facility: HOSPITAL | Age: 80
End: 2020-11-24
Attending: EMERGENCY MEDICINE
Payer: MEDICARE

## 2020-11-24 PROBLEM — R19.7 NAUSEA VOMITING AND DIARRHEA: Status: ACTIVE | Noted: 2020-11-24

## 2020-11-24 PROBLEM — R11.2 NAUSEA VOMITING AND DIARRHEA: Status: ACTIVE | Noted: 2020-11-24

## 2020-11-24 PROCEDURE — 70450 CT HEAD/BRAIN W/O DYE: CPT | Performed by: EMERGENCY MEDICINE

## 2020-11-24 PROCEDURE — 99219 INITIAL OBSERVATION CARE,LEVL II: CPT | Performed by: HOSPITALIST

## 2020-11-24 RX ORDER — DEXTROSE AND SODIUM CHLORIDE 5; .9 G/100ML; G/100ML
INJECTION, SOLUTION INTRAVENOUS CONTINUOUS
Status: DISCONTINUED | OUTPATIENT
Start: 2020-11-24 | End: 2020-11-27

## 2020-11-24 RX ORDER — ACETAMINOPHEN 325 MG/1
650 TABLET ORAL EVERY 6 HOURS PRN
Status: DISCONTINUED | OUTPATIENT
Start: 2020-11-24 | End: 2020-11-27

## 2020-11-24 RX ORDER — ONDANSETRON 2 MG/ML
4 INJECTION INTRAMUSCULAR; INTRAVENOUS EVERY 6 HOURS PRN
Status: DISCONTINUED | OUTPATIENT
Start: 2020-11-24 | End: 2020-11-27

## 2020-11-24 RX ORDER — SODIUM CHLORIDE 9 MG/ML
INJECTION, SOLUTION INTRAVENOUS CONTINUOUS
Status: DISCONTINUED | OUTPATIENT
Start: 2020-11-24 | End: 2020-11-24

## 2020-11-24 RX ORDER — ACETAMINOPHEN 10 MG/ML
15 INJECTION, SOLUTION INTRAVENOUS ONCE
Status: DISCONTINUED | OUTPATIENT
Start: 2020-11-25 | End: 2020-11-27

## 2020-11-24 RX ORDER — ENOXAPARIN SODIUM 100 MG/ML
30 INJECTION SUBCUTANEOUS DAILY
Status: DISCONTINUED | OUTPATIENT
Start: 2020-11-24 | End: 2020-11-27

## 2020-11-24 RX ORDER — VANCOMYCIN HYDROCHLORIDE 125 MG/1
125 CAPSULE ORAL 4 TIMES DAILY
Status: DISCONTINUED | OUTPATIENT
Start: 2020-11-24 | End: 2020-11-27

## 2020-11-24 RX ORDER — DEXTROSE AND SODIUM CHLORIDE 5; .9 G/100ML; G/100ML
INJECTION, SOLUTION INTRAVENOUS CONTINUOUS
Status: DISCONTINUED | OUTPATIENT
Start: 2020-11-24 | End: 2020-11-24

## 2020-11-24 RX ORDER — LEVOTHYROXINE SODIUM 0.2 MG/1
200 TABLET ORAL
Status: DISCONTINUED | OUTPATIENT
Start: 2020-11-24 | End: 2020-11-27

## 2020-11-24 RX ORDER — SODIUM CHLORIDE 9 MG/ML
INJECTION, SOLUTION INTRAVENOUS CONTINUOUS
Status: DISCONTINUED | OUTPATIENT
Start: 2020-11-24 | End: 2020-11-25

## 2020-11-24 RX ORDER — DEXTROSE MONOHYDRATE 25 G/50ML
50 INJECTION, SOLUTION INTRAVENOUS
Status: DISCONTINUED | OUTPATIENT
Start: 2020-11-24 | End: 2020-11-27

## 2020-11-24 RX ORDER — ACETAMINOPHEN 10 MG/ML
15 INJECTION, SOLUTION INTRAVENOUS EVERY 8 HOURS PRN
Status: DISCONTINUED | OUTPATIENT
Start: 2020-11-24 | End: 2020-11-24

## 2020-11-24 RX ORDER — METOCLOPRAMIDE HYDROCHLORIDE 5 MG/ML
5 INJECTION INTRAMUSCULAR; INTRAVENOUS EVERY 8 HOURS PRN
Status: DISCONTINUED | OUTPATIENT
Start: 2020-11-24 | End: 2020-11-27

## 2020-11-24 RX ORDER — ACETAMINOPHEN 500 MG
1000 TABLET ORAL ONCE
Status: COMPLETED | OUTPATIENT
Start: 2020-11-24 | End: 2020-11-24

## 2020-11-24 RX ORDER — ACETAMINOPHEN 10 MG/ML
15 INJECTION, SOLUTION INTRAVENOUS EVERY 8 HOURS
Status: DISCONTINUED | OUTPATIENT
Start: 2020-11-24 | End: 2020-11-24

## 2020-11-24 NOTE — ED PROVIDER NOTES
Patient Seen in: [facility] Emergency Department      History   Patient presents with:  Nausea/Vomiting/Diarrhea  Fatigue    Stated Complaint: weakness, not eating, vomiting    HPI    40-year-old woman from home coming for evaluation for generalized Normocephalic and atraumatic. Comments: Dry mucous membranes  Eyes:      General: No scleral icterus. Conjunctiva/sclera: Conjunctivae normal.   Neck:      Musculoskeletal: Normal range of motion and neck supple.    Cardiovascular:      Rate and Rh DIFFERENTIAL WITH PLATELET.   Procedure                               Abnormality         Status                     ---------                               -----------         ------                     CBC W/ DIFFERENTIAL[[id number]]          Abnormal specified.         Medications Prescribed:  Current Discharge Medication List                          Present on Admission           ICD-10-CM Noted POA    Nausea vomiting and diarrhea R11.2, R19.7 11/24/2020 Unknown

## 2020-11-24 NOTE — ED NOTES
Pt report given to LA Ortega. Pt sleeping in bed, no distress noted. Pt's daughter informed that pt is a R/O Covid, so no visitors are allowed in the inpatient unit at this time. Pt's daughter agreeable with this.  She was also informed that floor RN will g

## 2020-11-24 NOTE — PROGRESS NOTES
1440- Pts BG 46, 2 glucose oral liquids given, repeat BG is 61, 2 more glucose oral liquids given. Repeat . MD paged, awaiting response. WCTM.       1900- Pts , COVID PCR came back negative.  MD notified, plan is to transfer patient to another

## 2020-11-24 NOTE — ED NOTES
This PCT at bedside with RN to clean and change pt's soiled diaper. Pt now resting on cart with no other needs at this time.

## 2020-11-24 NOTE — ED NOTES
Assumed care, report received from [facility]. Pt dozing in bed, resps easy, non labored.  Daughter at bedside, she was updated of the POC

## 2020-11-24 NOTE — HOME CARE LIAISON
Ptnt current with [facility] for sn/sw.   Will need a YANIRA on or before dc    Thanks  [facility]

## 2020-11-24 NOTE — CONSULTS
Pharmacy note regarding Elridge Deep: Per Dr. Michael Lees note & conversation, he asked pharmacy to track what doctor ordered Elridge Deep and to get the patient records from that office.     Ordering physician for Elridge Deep: Dr. Lucille Bean

## 2020-11-24 NOTE — CM/SW NOTE
11/24/20 1400   CM/SW Screening   Information Source Chart review   Discharge Needs   Anticipated D/C needs Home health care;Transportation services     HOME SITUATION  Type of Home: House   Home Layout: One level  Stairs to Enter : 1  Stairs to [employer]

## 2020-11-24 NOTE — PROGRESS NOTES
Pt resting Aox2-3 this morning, soft spoken & speaks [address] but prefers [address] O2 sats within defined limits on room air. Afebrile, VSS, NSR on tele. Pt incontinent, arrived to floor soiled & replaced brief. Stool sample sent.   Pt skin fragile, scat

## 2020-11-24 NOTE — PROGRESS NOTES
9166- Received phone call from lab regarding positive c-diff result. MD paged, awaiting response. WCCHRISTIN.

## 2020-11-24 NOTE — H&P
[facility]E HOSPITALIST  History and Physical     Mayank Pierre Patient Status:  Emergency    1940 MRN [id number]   Location [address] [facility] Attending Keith Hylton MD   [facility] Day # 0 PCP Heramnn Arita     Chief Complaint: Nausea mouth daily. , Disp: , Rfl:     •  Llmrbuf-Lzrug-Ojdgbfgt-TenofAF (GENVOYA) [phone] MG Oral Tab, Take by mouth., Disp: , Rfl:     •  lisinopril 20 MG Oral Tab, Take 20 mg by mouth daily. , Disp: , Rfl:     •  Levothyroxine Sodium 175 MCG Oral Tab, [facility] on SCr of 1.17 mg/dL (H)). Recent Labs   Lab 11/23/20 2250   PTP 13.8   INR 1.03       Recent Labs   Lab 11/23/20 2250   TROP <0.045       Imaging: Imaging data reviewed in Epic. ASSESSMENT / PLAN:     1.  Fever in a 78years old woman to rule out

## 2020-11-24 NOTE — CONSULTS
INFECTIOUS DISEASE CONSULTATION    Mayank Pierre Patient Status:  Observation    1940 MRN [id number]   [facility] 5NW-A Attending Edi Parikh MD   Hosp Day # 0 PCP Hermann Arita Intravenous, Q15 Min PRN **OR** glucose (DEX4) oral liquid 30 g, 30 g, Oral, Q15 Min PRN **OR** Glucose-Vitamin C (DEX-4) chewable tab 8 tablet, 8 tablet, Oral, Q15 Min PRN  •  Insulin Aspart Pen (NOVOLOG) 100 UNIT/ML flexpen 1-10 Units, 1-10 Units, Subcut effusions  Skin: No lesions.  No erythema, no open wounds      Laboratory Data:  Laboratory data reviewed      Recent Labs   Lab 11/23/20  2250   RBC 3.23*   HGB 11.3*   HCT 32.6*   .9*   MCH 35.0*   MCHC 34.7   RDW 12.1   NEPRELIM 7.70   WBC 9.3   P

## 2020-11-24 NOTE — PROGRESS NOTES
NURSING ADMISSION NOTE      Patient admitted via Cart  Oriented to room. Safety precautions initiated. Bed in low position. Call light in reach. Pt admitted to room and admission navigator completed with pt's daughter via telephone.   Updated pt o

## 2020-11-24 NOTE — OCCUPATIONAL THERAPY NOTE
OCCUPATIONAL THERAPY EVALUATION - INPATIENT     Room Number: 510/510-A  Evaluation Date: 11/24/2020  Type of Evaluation: Initial  Presenting Problem: C-Diff    Physician Order: IP Consult to Occupational Therapy  Reason for Therapy: ADL/IADL Dysfunction an weight\" and reports that her mother had gotten to this point right before this admission.     Discussed pt's current weakness, dtr reports that she wishes to have her mother come home once discharged, and feels that pt has adequate support at home, 24/7, a Degree of Impairment: 46.65%  Standardized Score (AM-PAC Scale): 38.66  CMS Modifier (G-Code): CK    FUNCTIONAL TRANSFER ASSESSMENT  Supine to Sit : Minimum assistance  Sit to Stand: Minimum assistance    Skilled Therapy Provided: Pt was received supine in Deficits MODERATE - Comorbidities and min to mod modifications of tasks    Clinical Decision Making MODERATE - Analysis of occupational profile, detailed assessments, several treatment options    Overall Complexity MODERATE     OT Discharge Recommendations

## 2020-11-24 NOTE — ED NOTES
This PCT at bedside with [facility] PCT, pt had BM pta. Cleaned pt and changed pt into gown.

## 2020-11-24 NOTE — PLAN OF CARE
Problem: Impaired Activities of Daily Living  Goal: Achieve highest/safest level of independence in self care  Description: Interventions:  - Assess ability and encourage patient to participate in ADLs to maximize function  - Promote sitting position [facility]

## 2020-11-25 PROBLEM — E87.6 HYPOKALEMIA: Status: ACTIVE | Noted: 2020-11-25

## 2020-11-25 PROCEDURE — 99225 SUBSEQUENT OBSERVATION CARE: CPT | Performed by: INTERNAL MEDICINE

## 2020-11-25 RX ORDER — ELVITEGRAVIR, COBICISTAT, EMTRICITABINE, AND TENOFOVIR ALAFENAMIDE 150; 150; 200; 10 MG/1; MG/1; MG/1; MG/1
0.5 TABLET ORAL DAILY
Refills: 0 | Status: SHIPPED | COMMUNITY
Start: 2020-11-25

## 2020-11-25 RX ORDER — POTASSIUM CHLORIDE 20 MEQ/1
40 TABLET, EXTENDED RELEASE ORAL EVERY 4 HOURS
Status: COMPLETED | OUTPATIENT
Start: 2020-11-25 | End: 2020-11-26

## 2020-11-25 RX ORDER — VANCOMYCIN HYDROCHLORIDE 125 MG/1
CAPSULE ORAL
Qty: 100 CAPSULE | Refills: 0 | Status: SHIPPED | OUTPATIENT
Start: 2020-11-25 | End: 2021-01-04

## 2020-11-25 NOTE — PLAN OF CARE
Disclosure of health information form signed by patient and faxed to [facility] center. Forms now in chart along with confirmed transmission report. Awaiting response.

## 2020-11-25 NOTE — PROGRESS NOTES
Pt w/symptoms of hypoglycemia (shaking/drowsiness), blood sugar check at 92. Hypoglycemia protocol initiated & page to MD.  Repeat BG 74, second repeat 97. Addendum 0130: Symptomatic blood sugar 76, hypoglycemia protocol initiated again.   MD orders to

## 2020-11-25 NOTE — PHYSICAL THERAPY NOTE
PHYSICAL THERAPY TREATMENT NOTE - INPATIENT    Room Number: 510/510-A     Session: 1   Number of Visits to Meet Established Goals: 5     History related to current admission: Pt is 78year old female admitted on 11/23/2020 from home with c/o fever, confus have...  -   Turning over in bed (including adjusting bedclothes, sheets and blankets)?: A Little   -   Sitting down on and standing up from a chair with arms (e.g., wheelchair, bedside commode, etc.): A Little   -   Moving from lying on back to sitting on provide)     PLAN  PT Treatment Plan: Bed mobility; Endurance; Energy conservation;Patient education;Gait training;Range of motion;Strengthening;Transfer training  Rehab Potential : Good  Frequency (Obs): 5x/week    CURRENT GOALS     Goal #1 Patient is able

## 2020-11-25 NOTE — PLAN OF CARE
Pt resting Aox3, forgetful & drowsy. O2 saturations within defined limits on room air. Febrile, tmax 102.8 & rigors, improved with tylenol. ST when febrile, sepsis BPA fired & MD aware. NSR on tele otherwise. C.diff positive & frequent stools.   Very po highest/safest level of independence in self care  Description: Interventions:  - Assess ability and encourage patient to participate in ADLs to maximize function  - Promote sitting position while performing ADLs such as feeding, grooming, and bathing  - E

## 2020-11-25 NOTE — PLAN OF CARE
Pt is A/O X4, forgettful/drowsy. VSS. Maintaining O2 sats WNL on room air. T-Max 100.4, tylenol given. NSR-tele. Briefed and incontinent. Pt had multiple loose stools. Cdiff +, PO vanco given. Pt denies pain. Up with walker/assist. PT/OT recommends HHPT.  I encourage patient to participate in ADLs to maximize function  - Promote sitting position while performing ADLs such as feeding, grooming, and bathing  - Educate and encourage patient/family in tolerated functional activity level and precautions during aman

## 2020-11-25 NOTE — PROGRESS NOTES
[facility]  Progress Note    Roseanne Suarez Patient Status:  Observation    1940 MRN [id number]   [facility] 5NW-A Attending Marleny Seo MD   Hosp Day # 0 PCP Lashanda Arita     CC: Nausea vomiting generalized weakness diarrhea Epic.    Meds:     •  insulin detemir (LEVEMIR) 100 UNIT/ML flextouch 15 Units, 15 Units, Subcutaneous, Daily    •  Potassium Chloride ER (K-DUR M20) CR tab 40 mEq, 40 mEq, Oral, Q4H    •  Fluticasone Furoate-Vilanterol (BREO ELLIPTA) 100-25 MCG/INH inhale following  3. Diabetes mellitus type 2  1. Hyperglycemia protocol  2. On Levemir with NovoLog carb counting and correction factor  4. Acute kidney injury due to dehydration from diarrhea nausea vomiting  1. Improved with IV fluids  5.  Hypokalemia due to GI

## 2020-11-25 NOTE — PROGRESS NOTES
[facility]                INFECTIOUS DISEASE PROGRESS NOTE    Daron Saunders Patient Status:  Observation    1940 MRN [id number]   [facility] 5NW-A Attending Padmini Loredo MD   [facility] Day # 0 PCP Evelia Arita     Antibiotics: reviewed:  Patient Active Problem List:     MIREILLE (acute kidney injury) ([facility] [address]     Dehydration     Weakness generalized     Intermittent diarrhea     Acute cystitis without hematuria     Sepsis without acute organ dysfunction [facility])     Essential hypertension

## 2020-11-25 NOTE — CM/SW NOTE
Patient admitted 11/23 under observation status. Had 102.5 temperature overnight. Case reviewed w/ DERRELL RN.     Clementine Ching RN,   Phone [phone]

## 2020-11-26 PROCEDURE — 99225 SUBSEQUENT OBSERVATION CARE: CPT | Performed by: INTERNAL MEDICINE

## 2020-11-26 NOTE — PROGRESS NOTES
[facility]  Progress Note    Garrett Duke Patient Status:  Observation    1940 MRN [id number]   [facility] 5NW-A Attending Nidhi Pfeiffer MD   Hosp Day # 0 PCP Adi Arita     CC: Nausea vomiting generalized weakness diarrhea ELLIPTA) 100-25 MCG/INH inhaler 1 puff, 1 puff, Inhalation, Daily    •  Enoxaparin Sodium (LOVENOX) 30 MG/0.3ML injection 30 mg, 30 mg, Subcutaneous, Daily    •  ondansetron HCl (ZOFRAN) injection 4 mg, 4 mg, Intravenous, Q6H PRN    •  Metoclopramide HCl ( fluids  5. Hypokalemia due to GI losses from nausea vomiting and diarrhea  1.  Replace with electrolyte protocol          Quality:  · DVT Prophylaxis: SCD, subcutaneous Lovenox  · CODE status: full  · Banks: no  · Central line: no    Will the patient be ref

## 2020-11-26 NOTE — PLAN OF CARE
Problem: Diabetes/Glucose Control  Goal: Glucose maintained within prescribed range  Description: INTERVENTIONS:  - Monitor Blood Glucose as ordered  - Assess for signs and symptoms of hyperglycemia and hypoglycemia  - Administer ordered medications to m fluids  Patient communicates understanding, no pain. Still having diarrhea.  Order to transfer to medical floor once bed available

## 2020-11-27 VITALS
HEART RATE: 80 BPM | BODY MASS INDEX: 20.64 KG/M2 | WEIGHT: 105.13 LBS | DIASTOLIC BLOOD PRESSURE: 61 MMHG | OXYGEN SATURATION: 100 % | RESPIRATION RATE: 18 BRPM | TEMPERATURE: 98 F | HEIGHT: 60 IN | SYSTOLIC BLOOD PRESSURE: 140 MMHG

## 2020-11-27 PROCEDURE — 99217 OBSERVATION CARE DISCHARGE: CPT | Performed by: INTERNAL MEDICINE

## 2020-11-27 NOTE — CM/SW NOTE
MSW informed [facility]  P:[phone]  F:[phone] of likely dc today. RN will confirm mode of transportation.     Ciera Lozano LCSW

## 2020-11-27 NOTE — PROGRESS NOTES
Pt is aox4, VSS, afebrile. Glasses. RA. Tele NSR. Electrolyte protocol. Wearing a pull up but is mostly continent. x1 with walker. BG have been stable today. Cab controlled diet tolerating well. Will d/c this afternoon.  Resting comfortably in bed with call

## 2020-11-27 NOTE — PROGRESS NOTES
Problem: C. Diff      Data: Pt A&Ox4. Pt primarily [address] speaking but understands english. RA. . Telemetry monitored. Incont at times. Up with x1 with walker. IVF .  PO vanco . Pt and daughter updated on possible d/c plan .     Action: Keep patient comf

## 2020-11-27 NOTE — PROGRESS NOTES
[facility]  Progress Note    Gisella Dewey Patient Status:  Observation    1940 MRN [id number]   [facility] 5NW-A Attending Yanelis Dominguez MD   Hosp Day # 0 PCP Titus Arita     CC: Nausea vomiting generalized weakness diarrhea Inhalation, Daily    •  Enoxaparin Sodium (LOVENOX) 30 MG/0.3ML injection 30 mg, 30 mg, Subcutaneous, Daily    •  ondansetron HCl (ZOFRAN) injection 4 mg, 4 mg, Intravenous, Q6H PRN    •  Metoclopramide HCl (REGLAN) injection 5 mg, 5 mg, Intravenous, Q8H P diarrhea  1.  Replace with electrolyte protocol          Quality:  · DVT Prophylaxis: SCD, subcutaneous Lovenox  · CODE status: full  · Banks: no  · Central line: no    Will the patient be referred to [facility] on discharge?:  Follow-up with regular outpatient pr

## 2020-11-27 NOTE — PROGRESS NOTES
NURSING DISCHARGE NOTE    Discharged Home via Wheelchair. Accompanied by Family member  Belongings Taken by patient/family     Pt was d/c via wheelchair being picked up by god son and daughter. Gave pt paperwork and explained. Prescription called in.

## 2020-11-27 NOTE — PHYSICAL THERAPY NOTE
PHYSICAL THERAPY TREATMENT NOTE - INPATIENT    Room Number: 510/510-A     Session: 2  Number of Visits to Meet Established Goals: 5     History related to current admission: Pt is 78year old female admitted on 11/23/2020 from home with c/o fever, confusi AM-PAC '6-Clicks' INPATIENT SHORT FORM - BASIC MOBILITY  How much difficulty does the patient currently have. ..  -   Turning over in bed (including adjusting bedclothes, sheets and blankets)?: None   -   Sitting down on and standing up from a chair activity levels and continues to present with impaired strength and decreased endurance below PLOF. Pt states she is never alone at home and will have adequate assist. Pt states she can remain on the main level of home.   The AM-PAC '6-Clicks' Inpatient Ba

## 2020-11-27 NOTE — PROGRESS NOTES
Results for Saqib Haines (MRN [id number]) as of 11/26/2020 20:01   Ref. Range 11/26/2020 17:18 11/26/2020 17:42 11/26/2020 18:00   POC GLUCOSE Latest Ref Range: 70 - 99 mg/dL 54 (L) 78 89     Pt hypoglycemic this evening prior to d/c.  See MAR for glucose

## 2020-11-27 NOTE — PLAN OF CARE
A/O x4. Vital signs stable. Tolerating RA with saturations of >92%. Tele-NSR. Voiding freely. Frequent diarrhea, Cdiff   +. No complaints of pain. Up with standby assist and walker. PO vanco for Cdiff. IVF. Safety precautions in place.  Pt updated on plan o

## 2020-12-24 ENCOUNTER — LAB REQUISITION (OUTPATIENT)
Dept: LAB | Age: 80
End: 2020-12-24
Payer: MEDICARE

## 2020-12-24 DIAGNOSIS — R30.0 DYSURIA: ICD-10-CM

## 2020-12-24 PROCEDURE — 87088 URINE BACTERIA CULTURE: CPT | Performed by: FAMILY MEDICINE

## 2020-12-24 PROCEDURE — 81001 URINALYSIS AUTO W/SCOPE: CPT | Performed by: FAMILY MEDICINE

## 2020-12-24 PROCEDURE — 87186 SC STD MICRODIL/AGAR DIL: CPT | Performed by: FAMILY MEDICINE

## 2020-12-24 PROCEDURE — 87086 URINE CULTURE/COLONY COUNT: CPT | Performed by: FAMILY MEDICINE

## 2021-10-22 ENCOUNTER — LAB REQUISITION (OUTPATIENT)
Dept: LAB | Facility: HOSPITAL | Age: 81
End: 2021-10-22
Payer: MEDICARE

## 2021-10-22 DIAGNOSIS — E11.22 TYPE 2 DIABETES MELLITUS WITH DIABETIC CHRONIC KIDNEY DISEASE (HCC): ICD-10-CM

## 2021-10-22 PROCEDURE — 80053 COMPREHEN METABOLIC PANEL: CPT

## 2021-10-22 PROCEDURE — 84439 ASSAY OF FREE THYROXINE: CPT

## 2021-10-22 PROCEDURE — 85025 COMPLETE CBC W/AUTO DIFF WBC: CPT

## 2021-10-22 PROCEDURE — 83036 HEMOGLOBIN GLYCOSYLATED A1C: CPT

## 2021-10-22 PROCEDURE — 82306 VITAMIN D 25 HYDROXY: CPT

## 2021-10-22 PROCEDURE — 84443 ASSAY THYROID STIM HORMONE: CPT

## 2021-10-22 PROCEDURE — 86038 ANTINUCLEAR ANTIBODIES: CPT

## 2021-11-22 ENCOUNTER — LAB REQUISITION (OUTPATIENT)
Dept: LAB | Facility: HOSPITAL | Age: 81
End: 2021-11-22
Payer: MEDICARE

## 2021-11-22 DIAGNOSIS — E11.22 TYPE 2 DIABETES MELLITUS WITH DIABETIC CHRONIC KIDNEY DISEASE (HCC): ICD-10-CM

## 2021-11-22 PROCEDURE — 84443 ASSAY THYROID STIM HORMONE: CPT

## 2021-11-22 PROCEDURE — 80053 COMPREHEN METABOLIC PANEL: CPT

## 2021-11-22 PROCEDURE — 87086 URINE CULTURE/COLONY COUNT: CPT

## 2021-11-22 PROCEDURE — 81342 TRG GENE REARRANGEMENT ANAL: CPT

## 2021-11-22 PROCEDURE — 85025 COMPLETE CBC W/AUTO DIFF WBC: CPT

## 2021-11-22 PROCEDURE — 81001 URINALYSIS AUTO W/SCOPE: CPT

## 2021-11-22 PROCEDURE — 87536 HIV-1 QUANT&REVRSE TRNSCRPJ: CPT

## 2021-12-22 NOTE — PHYSICAL THERAPY NOTE
PHYSICAL THERAPY EVALUATION - INPATIENT     Room Number: 510/510-A  Evaluation Date: 11/24/2020  Type of Evaluation: Initial  Physician Order: PT Eval and Treat    Presenting Problem: confusion, fever, weakness, (-) for UTI this admit  Reason for The right before this admission.     Discussed pt's current weakness, dtr reports that she wishes to have her mother come home once discharged, and feels that pt has adequate support at home, 24/7, and is also agreeable to continuing home PT.     SUBJECTIVE  \" adjusting bedclothes, sheets and blankets)?: A Lot   -   Sitting down on and standing up from a chair with arms (e.g., wheelchair, bedside commode, etc.): A Little   -   Moving from lying on back to sitting on the side of the bed?: A Lot   How much help [age] year old female admitted on 11/23/2020 for fever, diarrhea, confusion, diagnosed with dehydration, (-) for UTI. Pertinent comorbidities and personal factors impacting therapy include h/o DM, htn, asthma, recent admit for UTI.   Rapid SARS CoV-2 (-)  In thi assistance level: supervision     Goal #4    Goal #5    Goal #6    Goal Comments: Goals established on 11/24/2020  PPE worn by therapist throughout session, surgical mask and gloves, goggles, gown. Surgical mask worn by patient  at all times. Possibly reactive 2/2 infection

## 2022-06-06 PROCEDURE — 88305 TISSUE EXAM BY PATHOLOGIST: CPT | Performed by: CLINICAL MEDICAL LABORATORY

## 2022-06-07 ENCOUNTER — LAB REQUISITION (OUTPATIENT)
Dept: LAB | Age: 82
End: 2022-06-07

## 2022-06-07 DIAGNOSIS — D48.5 NEOPLASM OF UNCERTAIN BEHAVIOR OF SKIN: ICD-10-CM

## 2022-06-08 LAB
CASE RPRT: NORMAL
CLINICAL INFO: NORMAL
PATH REPORT.FINAL DX SPEC: NORMAL
PATH REPORT.GROSS SPEC: NORMAL

## 2022-10-07 ENCOUNTER — LAB REQUISITION (OUTPATIENT)
Dept: LAB | Facility: HOSPITAL | Age: 82
End: 2022-10-07
Payer: MEDICARE

## 2022-10-07 DIAGNOSIS — Z21 ASYMPTOMATIC HUMAN IMMUNODEFICIENCY VIRUS (HIV) INFECTION STATUS (HCC): ICD-10-CM

## 2022-10-07 DIAGNOSIS — E11.9 TYPE 2 DIABETES MELLITUS WITHOUT COMPLICATIONS (HCC): ICD-10-CM

## 2022-10-07 LAB
ALBUMIN SERPL-MCNC: 3.6 G/DL (ref 3.4–5)
ALBUMIN/GLOB SERPL: 0.9 {RATIO} (ref 1–2)
ALP LIVER SERPL-CCNC: 93 U/L
ALT SERPL-CCNC: 13 U/L
ANION GAP SERPL CALC-SCNC: 6 MMOL/L (ref 0–18)
AST SERPL-CCNC: 15 U/L (ref 15–37)
BILIRUB SERPL-MCNC: 0.4 MG/DL (ref 0.1–2)
BUN BLD-MCNC: 14 MG/DL (ref 7–18)
CALCIUM BLD-MCNC: 9.4 MG/DL (ref 8.5–10.1)
CHLORIDE SERPL-SCNC: 106 MMOL/L (ref 98–112)
CO2 SERPL-SCNC: 22 MMOL/L (ref 21–32)
CREAT BLD-MCNC: 0.92 MG/DL
EST. AVERAGE GLUCOSE BLD GHB EST-MCNC: 220 MG/DL (ref 68–126)
GFR SERPLBLD BASED ON 1.73 SQ M-ARVRAT: 61 ML/MIN/1.73M2 (ref 60–?)
GLOBULIN PLAS-MCNC: 4 G/DL (ref 2.8–4.4)
GLUCOSE BLD-MCNC: 275 MG/DL (ref 70–99)
HBA1C MFR BLD: 9.3 % (ref ?–5.7)
OSMOLALITY SERPL CALC.SUM OF ELEC: 288 MOSM/KG (ref 275–295)
POTASSIUM SERPL-SCNC: 4 MMOL/L (ref 3.5–5.1)
PROT SERPL-MCNC: 7.6 G/DL (ref 6.4–8.2)
SODIUM SERPL-SCNC: 134 MMOL/L (ref 136–145)
T4 FREE SERPL-MCNC: 1.3 NG/DL (ref 0.8–1.7)
TSI SER-ACNC: 6.13 MIU/ML (ref 0.36–3.74)

## 2022-10-07 PROCEDURE — 83036 HEMOGLOBIN GLYCOSYLATED A1C: CPT | Performed by: FAMILY MEDICINE

## 2022-10-07 PROCEDURE — 84439 ASSAY OF FREE THYROXINE: CPT | Performed by: FAMILY MEDICINE

## 2022-10-07 PROCEDURE — 86702 HIV-2 ANTIBODY: CPT | Performed by: FAMILY MEDICINE

## 2022-10-07 PROCEDURE — 84443 ASSAY THYROID STIM HORMONE: CPT | Performed by: FAMILY MEDICINE

## 2022-10-07 PROCEDURE — 87389 HIV-1 AG W/HIV-1&-2 AB AG IA: CPT | Performed by: FAMILY MEDICINE

## 2022-10-07 PROCEDURE — 87536 HIV-1 QUANT&REVRSE TRNSCRPJ: CPT | Performed by: FAMILY MEDICINE

## 2022-10-07 PROCEDURE — 80053 COMPREHEN METABOLIC PANEL: CPT | Performed by: FAMILY MEDICINE

## 2022-10-07 PROCEDURE — 86701 HIV-1ANTIBODY: CPT | Performed by: FAMILY MEDICINE

## 2022-10-10 LAB — HIV-2 ANTIBODY: NEGATIVE

## 2022-10-11 LAB
HIV-1 QNT BY NAAT (COPIES/ML): NOT DETECTED CPY/ML
HIV-1 QNT BY NAAT (LOG COPIES/ML): NOT DETECTED LOG CPY/ML
HIV-1 QNT BY NAAT INTERP: NOT DETECTED

## 2023-07-25 ENCOUNTER — LAB REQUISITION (OUTPATIENT)
Dept: LAB | Age: 83
End: 2023-07-25
Payer: MEDICARE

## 2023-07-25 DIAGNOSIS — E11.40 TYPE 2 DIABETES MELLITUS WITH DIABETIC NEUROPATHY, UNSPECIFIED (HCC): ICD-10-CM

## 2023-07-25 LAB
ALBUMIN SERPL-MCNC: 3.8 G/DL (ref 3.4–5)
ALBUMIN/GLOB SERPL: 1.1 {RATIO} (ref 1–2)
ALP LIVER SERPL-CCNC: 76 U/L
ALT SERPL-CCNC: 17 U/L
ANION GAP SERPL CALC-SCNC: 6 MMOL/L (ref 0–18)
AST SERPL-CCNC: 14 U/L (ref 15–37)
BILIRUB SERPL-MCNC: 0.7 MG/DL (ref 0.1–2)
BUN BLD-MCNC: 18 MG/DL (ref 7–18)
CALCIUM BLD-MCNC: 9.1 MG/DL (ref 8.5–10.1)
CHLORIDE SERPL-SCNC: 110 MMOL/L (ref 98–112)
CO2 SERPL-SCNC: 23 MMOL/L (ref 21–32)
CREAT BLD-MCNC: 1.03 MG/DL
EGFRCR SERPLBLD CKD-EPI 2021: 53 ML/MIN/1.73M2 (ref 60–?)
EST. AVERAGE GLUCOSE BLD GHB EST-MCNC: 258 MG/DL (ref 68–126)
GLOBULIN PLAS-MCNC: 3.4 G/DL (ref 2.8–4.4)
GLUCOSE BLD-MCNC: 296 MG/DL (ref 70–99)
HBA1C MFR BLD: 10.6 % (ref ?–5.7)
OSMOLALITY SERPL CALC.SUM OF ELEC: 301 MOSM/KG (ref 275–295)
POTASSIUM SERPL-SCNC: 3.8 MMOL/L (ref 3.5–5.1)
PROT SERPL-MCNC: 7.2 G/DL (ref 6.4–8.2)
SODIUM SERPL-SCNC: 139 MMOL/L (ref 136–145)
TSI SER-ACNC: 10.5 MIU/ML (ref 0.36–3.74)

## 2023-07-25 PROCEDURE — 86355 B CELLS TOTAL COUNT: CPT | Performed by: FAMILY MEDICINE

## 2023-07-25 PROCEDURE — 80053 COMPREHEN METABOLIC PANEL: CPT | Performed by: FAMILY MEDICINE

## 2023-07-25 PROCEDURE — 86359 T CELLS TOTAL COUNT: CPT | Performed by: FAMILY MEDICINE

## 2023-07-25 PROCEDURE — 83036 HEMOGLOBIN GLYCOSYLATED A1C: CPT | Performed by: FAMILY MEDICINE

## 2023-07-25 PROCEDURE — 86360 T CELL ABSOLUTE COUNT/RATIO: CPT | Performed by: FAMILY MEDICINE

## 2023-07-25 PROCEDURE — 84443 ASSAY THYROID STIM HORMONE: CPT | Performed by: FAMILY MEDICINE

## 2023-07-25 PROCEDURE — 86357 NK CELLS TOTAL COUNT: CPT | Performed by: FAMILY MEDICINE

## 2023-07-26 ENCOUNTER — LAB REQUISITION (OUTPATIENT)
Dept: LAB | Facility: HOSPITAL | Age: 83
End: 2023-07-26
Payer: MEDICARE

## 2023-07-26 DIAGNOSIS — Z01.89 ENCOUNTER FOR OTHER SPECIFIED SPECIAL EXAMINATIONS: ICD-10-CM

## 2023-07-26 LAB
CD19 CELLS NFR SPEC: 14 %
CD3 CELLS NFR SPEC: 63 %
CD3+CD4+ CELLS # BLD: 663 /MM3
CD3+CD4+ CELLS NFR BLD: 39 %
CD3+CD4+ CELLS/CD3+CD8+ CLL SPEC: 1.7
CD3+CD8+ CELLS NFR SPEC: 23 %
CD3-CD16+CD56+ CELLS NFR SPEC: 24 %

## 2023-07-26 PROCEDURE — 87536 HIV-1 QUANT&REVRSE TRNSCRPJ: CPT | Performed by: FAMILY MEDICINE

## 2023-07-27 LAB
HIV 1 RNA PCR: 20 COPIES/ML
LOG10 HIV1 RNA: 1.3 LOG10COPY/ML

## 2023-08-31 ENCOUNTER — LAB REQUISITION (OUTPATIENT)
Dept: LAB | Facility: HOSPITAL | Age: 83
End: 2023-08-31
Payer: MEDICARE

## 2023-08-31 DIAGNOSIS — N39.0 URINARY TRACT INFECTION, SITE NOT SPECIFIED: ICD-10-CM

## 2023-08-31 PROCEDURE — 87086 URINE CULTURE/COLONY COUNT: CPT | Performed by: FAMILY MEDICINE

## 2024-07-12 ENCOUNTER — LAB ENCOUNTER (OUTPATIENT)
Dept: LAB | Age: 84
End: 2024-07-12
Attending: FAMILY MEDICINE
Payer: MEDICARE